# Patient Record
Sex: FEMALE | Race: WHITE | NOT HISPANIC OR LATINO | Employment: OTHER | URBAN - NONMETROPOLITAN AREA
[De-identification: names, ages, dates, MRNs, and addresses within clinical notes are randomized per-mention and may not be internally consistent; named-entity substitution may affect disease eponyms.]

---

## 2018-11-20 ENCOUNTER — TELEPHONE (OUTPATIENT)
Dept: VASCULAR SURGERY | Facility: CLINIC | Age: 48
End: 2018-11-20

## 2022-04-11 ENCOUNTER — TELEPHONE (OUTPATIENT)
Dept: VASCULAR SURGERY | Facility: CLINIC | Age: 52
End: 2022-04-11

## 2022-04-11 NOTE — TELEPHONE ENCOUNTER
Left message for Dr Briones office to please fax over testing results of arteries and veins for Ms Evelyne Chao. I advised Dr Briones's office to please fax over to [phone].

## 2022-04-14 ENCOUNTER — TELEPHONE (OUTPATIENT)
Dept: VASCULAR SURGERY | Facility: CLINIC | Age: 52
End: 2022-04-14

## 2022-04-14 NOTE — TELEPHONE ENCOUNTER
Left message reminding Ms Chao of her appointment for Friday, April 15th, 2022 at 10 am with Melinda MCKEON. Also advised Ms Chao if she had any questions, concerns or needs to reschedule to please call the office at [phone].

## 2022-04-15 ENCOUNTER — OFFICE VISIT (OUTPATIENT)
Dept: VASCULAR SURGERY | Facility: CLINIC | Age: 52
End: 2022-04-15

## 2022-04-15 ENCOUNTER — PATIENT ROUNDING (BHMG ONLY) (OUTPATIENT)
Dept: VASCULAR SURGERY | Facility: CLINIC | Age: 52
End: 2022-04-15

## 2022-04-15 VITALS — DIASTOLIC BLOOD PRESSURE: 76 MMHG | OXYGEN SATURATION: 96 % | HEIGHT: 66 IN | SYSTOLIC BLOOD PRESSURE: 118 MMHG

## 2022-04-15 DIAGNOSIS — E78.2 MIXED HYPERLIPIDEMIA: ICD-10-CM

## 2022-04-15 DIAGNOSIS — I10 BENIGN ESSENTIAL HTN: ICD-10-CM

## 2022-04-15 DIAGNOSIS — I73.9 PAD (PERIPHERAL ARTERY DISEASE): Primary | ICD-10-CM

## 2022-04-15 DIAGNOSIS — I65.23 BILATERAL CAROTID ARTERY STENOSIS: ICD-10-CM

## 2022-04-15 PROCEDURE — 99204 OFFICE O/P NEW MOD 45 MIN: CPT | Performed by: NURSE PRACTITIONER

## 2022-04-15 RX ORDER — RIVAROXABAN 20 MG/1
20 TABLET, FILM COATED ORAL DAILY
COMMUNITY
Start: 2022-02-07 | End: 2022-11-30

## 2022-04-15 RX ORDER — HYDROCODONE BITARTRATE AND ACETAMINOPHEN 7.5; 325 MG/1; MG/1
1 TABLET ORAL EVERY 8 HOURS PRN
COMMUNITY
Start: 2022-03-31

## 2022-04-15 RX ORDER — HYDROCHLOROTHIAZIDE 25 MG/1
TABLET ORAL
COMMUNITY

## 2022-04-15 RX ORDER — METOPROLOL TARTRATE 50 MG/1
50 TABLET, FILM COATED ORAL 2 TIMES DAILY
COMMUNITY
Start: 2022-02-07 | End: 2022-11-30

## 2022-04-15 RX ORDER — TIZANIDINE 4 MG/1
TABLET ORAL DAILY
COMMUNITY

## 2022-04-15 RX ORDER — DULOXETIN HYDROCHLORIDE 60 MG/1
CAPSULE, DELAYED RELEASE ORAL
COMMUNITY
End: 2022-11-30

## 2022-04-15 RX ORDER — LISINOPRIL 20 MG/1
TABLET ORAL
COMMUNITY

## 2022-04-15 RX ORDER — NITROGLYCERIN 0.4 MG/1
TABLET SUBLINGUAL
COMMUNITY

## 2022-04-15 RX ORDER — CYCLOBENZAPRINE HCL 10 MG
TABLET ORAL
COMMUNITY
End: 2022-11-30

## 2022-04-15 NOTE — PROGRESS NOTES
April 15, 2022    Hello, may I speak with Evelyne Chao?    My name is Karen Khoury      I am  with [facility] SURG [facility] VASCULAR SURGERY  [address], [address]  [place] [address] [phone].    Before we get started may I verify your date of birth? 1970    I am calling to officially welcome you to our practice and ask about your recent visit. Is this a good time to talk? yes    Tell me about your visit with us. What things went well?  All staff was very friendly, wait time in back was a little long.     We're always looking for ways to make our patients' experiences even better. Do you have recommendations on ways we may improve?  no    Overall were you satisfied with your first visit to our practice? Yes, it went well.       I appreciate you taking the time to speak with me today. Is there anything else I can do for you? no      Thank you, and have a great day.

## 2022-04-15 NOTE — PROGRESS NOTES
04/15/2022      Wilmar Sewell MD  [address] [place] [address]    Evelyne Chao  1970    Chief Complaint   Patient presents with   • Establish Care     Referred over by Dr Wilmar Sewell for Peripheral Vascular Disease with Leg Pain. Patient denies any stroke like symptoms.    • Other     Patient states she has vascular blockages, with a herniated disk in the back as well. Patient states she has been having bilateral leg pain for the last 3 years.    • smoker     Patient is a Current Everyday Smoker   • Med Management     Verbally verified medications with patient        Dear Wilmar Sewell MD:      HPI  I had the pleasure of seeing your patient Evelyne Chao in the office today.  Thank you kindly for this consultation.  As you recall, Evelyne Chao is a 51 y.o.  female who you are currently following for routine health maintenance.  She reports being told many years ago that she had vascular blockages.  She has been having pain for the past few years.  She has had previous neck surgery.  She also states she does have chronic back pain.  She will be seeing Dr. Scott.   She states her legs are very painful just at rest.  She is maintained on Xarelto.  She is intolerant to multiple statins.    Past Medical History:   Diagnosis Date   • Chronic midline low back pain with bilateral sciatica    • Coronary artery disease    • Diabetes mellitus (HCC)    • Hyperlipidemia    • Hypertension        Past Surgical History:   Procedure Laterality Date   • NECK SURGERY  2020       History reviewed. No pertinent family history.    Social History     Socioeconomic History   • Marital status:    Tobacco Use   • Smoking status: Current Every Day Smoker   • Smokeless tobacco: Never Used   Substance and Sexual Activity   • Alcohol use: Never   • Drug use: Never   • Sexual activity: Defer       Allergies   Allergen Reactions   • Other Other (See Comments)   • Erythromycin Base Other (See Comments)  "      Current Outpatient Medications   Medication Instructions   • cyclobenzaprine (FLEXERIL) 10 MG tablet cyclobenzaprine 10 mg tablet   TAKE 1 TABLET BY MOUTH TWICE DAILY AS NEEDED   • DULoxetine (CYMBALTA) 60 MG capsule duloxetine 60 mg capsule,delayed release   • hydroCHLOROthiazide (HYDRODIURIL) 25 MG tablet hydrochlorothiazide 25 mg tablet   TAKE 1 TABLET BY MOUTH ONCE DAILY   • HYDROcodone-acetaminophen (NORCO) 7.5-325 MG per tablet 1 tablet, Oral, Every 8 Hours PRN, for pain   • lisinopril (PRINIVIL,ZESTRIL) 20 MG tablet lisinopril 20 mg tablet   TAKE 1 TABLET BY MOUTH ONCE DAILY   • metFORMIN (GLUCOPHAGE) 500 MG tablet metformin 500 mg tablet   TAKE 1 TABLET BY MOUTH ONCE DAILY   • metoprolol tartrate (LOPRESSOR) 50 mg, Oral, 2 Times Daily   • nitroglycerin (NITROSTAT) 0.4 MG SL tablet nitroglycerin 0.4 mg sublingual tablet   DISSOLVE ONE TABLET UNDER THE TONGUE EVERY 5 MINUTES AS NEEDED FOR CHEST PAIN. DO NOT EXCEED A TOTAL OF 3 DOSES IN 15 MINUTES   • tiZANidine (ZANAFLEX) 4 MG tablet tizanidine 4 mg tablet   • verapamil SR (CALAN-SR) 180 MG CR tablet verapamil ER (SR) 180 mg tablet,extended release   TAKE 1 TABLET BY MOUTH ONCE DAILY   • Xarelto 20 mg, Oral, Daily         Review of Systems   Constitutional: Negative.    HENT: Negative.    Eyes: Negative.    Respiratory: Negative.    Cardiovascular: Negative.         Severe leg pain   Gastrointestinal: Negative.    Endocrine: Negative.    Genitourinary: Negative.    Musculoskeletal: Positive for back pain, gait problem and neck pain.   Skin: Negative.    Allergic/Immunologic: Negative.    Hematological: Negative.    Psychiatric/Behavioral: Negative.        /76 (BP Location: Left arm, Patient Position: Sitting, Cuff Size: Adult)   Ht 167.6 cm (66\")   SpO2 96%   Physical Exam  Vitals and nursing note reviewed.   Constitutional:       General: She is not in acute distress.     Appearance: She is well-developed. She is not diaphoretic.   HENT:      " Head: Normocephalic and atraumatic.   Eyes:      General: No scleral icterus.     Pupils: Pupils are equal, round, and reactive to light.   Neck:      Thyroid: No thyromegaly.      Vascular: No carotid bruit or JVD.   Cardiovascular:      Rate and Rhythm: Normal rate and regular rhythm.      Pulses: Normal pulses.      Heart sounds: Normal heart sounds and S2 normal. No murmur heard.    No friction rub. No gallop.      Comments: Palpable pulses  Pulmonary:      Effort: Pulmonary effort is normal.      Breath sounds: Normal breath sounds.   Abdominal:      General: Bowel sounds are normal.      Palpations: Abdomen is soft.   Musculoskeletal:         General: Normal range of motion.      Cervical back: Normal range of motion and neck supple.   Skin:     General: Skin is warm and dry.   Neurological:      Mental Status: She is alert and oriented to person, place, and time.      Cranial Nerves: No cranial nerve deficit.   Psychiatric:         Mood and Affect: Mood normal.         Behavior: Behavior normal.         Thought Content: Thought content normal.         Judgment: Judgment normal.         No results found.    Patient Active Problem List   Diagnosis   • Diabetes mellitus (HCC)   • Hyperlipidemia   • Chronic midline low back pain with bilateral sciatica   • Benign essential HTN         ICD-10-CM ICD-9-CM   1. PAD (peripheral artery disease) ([facility])  I73.9 443.9   2. Bilateral carotid artery stenosis  I65.23 433.10     433.30   3. Mixed hyperlipidemia  E78.2 272.2   4. Benign essential HTN  I10 401.1           Plan: After thoroughly evaluating Evelyne Chao, I believe the best course of action is to remain conservative from vascular surgery standpoint.  She is having significant pain to her lower extremities just at rest.  She also cannot walk per her report.  She has palpable pulses.  She does note previously she had testing showing some vascular disease but at that time nothing to do anything about.  She has  chronic back pain and symptoms sound mostly related to this with weakness to her lower extremities and severe pain at rest.  Her feet are nice and warm with palpable pulses.  We will see her back in 6 months but repeat noninvasive testing including ABIs and a carotid duplex.  I did discuss vascular risk factors as they pertain to the progression of vascular disease including controlling her hypertension and hyperlipidemia.  Her blood pressure stable on her current medications.  Her labs are monitored by her primary care provider but she is intolerant to statins.  The patient can continue taking their current medication regimen as previously planned.  This was all discussed in full with complete understanding.    Thank you for allowing me to participate in the care of your patient.  Please do not hesitate with any questions or concerns.  I will keep you aware of any further encounters with Evelyne Chao.        Sincerely yours,         LINDA Mirza

## 2022-04-20 PROBLEM — G89.29 CHRONIC MIDLINE LOW BACK PAIN WITH BILATERAL SCIATICA: Status: ACTIVE | Noted: 2022-04-20

## 2022-04-20 PROBLEM — E11.9 DIABETES MELLITUS: Status: ACTIVE | Noted: 2022-04-20

## 2022-04-20 PROBLEM — I10 BENIGN ESSENTIAL HTN: Status: ACTIVE | Noted: 2022-04-20

## 2022-04-20 PROBLEM — M54.42 CHRONIC MIDLINE LOW BACK PAIN WITH BILATERAL SCIATICA: Status: ACTIVE | Noted: 2022-04-20

## 2022-04-20 PROBLEM — M54.41 CHRONIC MIDLINE LOW BACK PAIN WITH BILATERAL SCIATICA: Status: ACTIVE | Noted: 2022-04-20

## 2022-04-20 PROBLEM — E78.5 HYPERLIPIDEMIA: Status: ACTIVE | Noted: 2022-04-20

## 2022-10-18 ENCOUNTER — TELEPHONE (OUTPATIENT)
Dept: VASCULAR SURGERY | Facility: CLINIC | Age: 52
End: 2022-10-18

## 2022-11-30 ENCOUNTER — OFFICE VISIT (OUTPATIENT)
Dept: CARDIOLOGY | Facility: CLINIC | Age: 52
End: 2022-11-30

## 2022-11-30 VITALS
HEIGHT: 66 IN | BODY MASS INDEX: 24.65 KG/M2 | DIASTOLIC BLOOD PRESSURE: 78 MMHG | WEIGHT: 153.4 LBS | OXYGEN SATURATION: 98 % | SYSTOLIC BLOOD PRESSURE: 120 MMHG | HEART RATE: 84 BPM

## 2022-11-30 DIAGNOSIS — I48.0 PAROXYSMAL ATRIAL FIBRILLATION: ICD-10-CM

## 2022-11-30 DIAGNOSIS — R07.89 CHEST PRESSURE: Primary | ICD-10-CM

## 2022-11-30 DIAGNOSIS — I73.9 PAD (PERIPHERAL ARTERY DISEASE): ICD-10-CM

## 2022-11-30 DIAGNOSIS — Z72.0 TOBACCO ABUSE: ICD-10-CM

## 2022-11-30 DIAGNOSIS — E78.2 MIXED HYPERLIPIDEMIA: ICD-10-CM

## 2022-11-30 DIAGNOSIS — I10 BENIGN ESSENTIAL HTN: ICD-10-CM

## 2022-11-30 PROBLEM — Z95.818 IMPLANTABLE LOOP RECORDER PRESENT: Status: ACTIVE | Noted: 2022-11-30

## 2022-11-30 PROCEDURE — 93000 ELECTROCARDIOGRAM COMPLETE: CPT | Performed by: INTERNAL MEDICINE

## 2022-11-30 PROCEDURE — 99204 OFFICE O/P NEW MOD 45 MIN: CPT | Performed by: INTERNAL MEDICINE

## 2022-11-30 RX ORDER — LANOLIN ALCOHOL/MO/W.PET/CERES
1000 CREAM (GRAM) TOPICAL DAILY
COMMUNITY

## 2022-11-30 RX ORDER — ASPIRIN 81 MG/1
81 TABLET ORAL DAILY
COMMUNITY

## 2022-11-30 NOTE — PROGRESS NOTES
Reason for Visit: Atrial fibrillation, heart disease.    HPI:  Evelyne Chao is a 52 y.o. female is being seen as a Self Referring for atrial fibrillation and heart disease.  She previously followed with a cardiologist in [place], Dr Soto, but he moved to [place].  She did have some testing done in [place] as well.  She reports having 40% blockage in the past, but most recently had a heart cath at [facility] in [place] did not show any significant disease.  She has been under a lot of stress recently with the death of her dad.  She reports having pressure in her chest and numbness in her jaw.  This tends to happen randomly.  She has not been successful at quitting smoking.  She reports being diagnosed with atrial fibrillation in January.      Previous Cardiac Testing and Procedures:  -Stress ECHO (12/17/2014) low risk for ischemia    Lab data:   -Lipid panel (9/8/2021) 330/20/NC/1173  -BMP (2/7/2022) creatinine 0.7, potassium 3.7, sodium 132  -Troponin (2/7/2022) < 0.012    Patient Active Problem List   Diagnosis   • Diabetes mellitus (HCC)   • Mixed hyperlipidemia   • Chronic midline low back pain with bilateral sciatica   • Benign essential HTN   • Tobacco abuse   • PAD (peripheral artery disease) ([facility])   • Implantable loop recorder present   • Paroxysmal atrial fibrillation ([facility])       Social History     Tobacco Use   • Smoking status: Every Day     Packs/day: 1.00     Types: Cigarettes   • Smokeless tobacco: Never   Vaping Use   • Vaping Use: Former   Substance Use Topics   • Alcohol use: Never   • Drug use: Not Currently       Family History   Problem Relation Age of Onset   • Hypertension Mother    • Diabetes Mother    • Diabetes Father        The following portions of the patient's history were reviewed and updated as appropriate: allergies, current medications, past family history, past medical history, past social history, past surgical history and problem list.      Current Outpatient  "Medications:   •  aspirin 81 MG EC tablet, Take 81 mg by mouth Daily., Disp: , Rfl:   •  hydroCHLOROthiazide (HYDRODIURIL) 25 MG tablet, hydrochlorothiazide 25 mg tablet  TAKE 1 TABLET BY MOUTH ONCE DAILY, Disp: , Rfl:   •  HYDROcodone-acetaminophen (NORCO) 7.5-325 MG per tablet, Take 1 tablet by mouth Every 8 (Eight) Hours As Needed. for pain, Disp: , Rfl:   •  lisinopril (PRINIVIL,ZESTRIL) 20 MG tablet, lisinopril 20 mg tablet  TAKE 1 TABLET BY MOUTH ONCE DAILY, Disp: , Rfl:   •  metFORMIN (GLUCOPHAGE) 500 MG tablet, metformin 500 mg tablet  TAKE 1 TABLET BY MOUTH ONCE DAILY, Disp: , Rfl:   •  nitroglycerin (NITROSTAT) 0.4 MG SL tablet, nitroglycerin 0.4 mg sublingual tablet  DISSOLVE ONE TABLET UNDER THE TONGUE EVERY 5 MINUTES AS NEEDED FOR CHEST PAIN. DO NOT EXCEED A TOTAL OF 3 DOSES IN 15 MINUTES, Disp: , Rfl:   •  tiZANidine (ZANAFLEX) 4 MG tablet, Take  by mouth Daily., Disp: , Rfl:   •  verapamil SR (CALAN-SR) 180 MG CR tablet, verapamil ER (SR) 180 mg tablet,extended release  TAKE 1 TABLET BY MOUTH ONCE DAILY, Disp: , Rfl:   •  vitamin B-12 (CYANOCOBALAMIN) 1000 MCG tablet, Take 1,000 mcg by mouth Daily., Disp: , Rfl:     Review of Systems   Constitutional: Negative for chills and fever.   Cardiovascular: Positive for chest pain (tightness). Negative for paroxysmal nocturnal dyspnea.   Respiratory: Negative for cough and shortness of breath.    Skin: Negative for rash.   Gastrointestinal: Negative for abdominal pain and heartburn.   Neurological: Negative for dizziness and numbness.       Objective   /78 (BP Location: Left arm, Patient Position: Sitting, Cuff Size: Adult)   Pulse 84   Ht 167.6 cm (66\")   Wt 69.6 kg (153 lb 6.4 oz)   SpO2 98%   BMI 24.76 kg/m²   Constitutional:       Appearance: Well-developed and normal weight.   HENT:      Head: Normocephalic and atraumatic.   Pulmonary:      Effort: Pulmonary effort is normal.      Breath sounds: Normal breath sounds.   Cardiovascular:      " "Normal rate. Regular rhythm.      Murmurs: There is no murmur.      No gallop. No click.   Skin:     General: Skin is warm and dry.   Neurological:      Mental Status: Alert and oriented to person, place, and time.         ECG 12 Lead    Date/Time: 11/30/2022 11:39 AM  Performed by: Phong Loera MD  Authorized by: Phong Loera MD   Comparison: compared with previous ECG from 12/16/2014  Similar to previous ECG  Rhythm: sinus rhythm  Rate: normal  Other findings: prolonged QTc interval              ICD-10-CM ICD-9-CM   1. Chest pressure  R07.89 786.59   2. Paroxysmal atrial fibrillation (HCC)  I48.0 427.31   3. PAD (peripheral artery disease) (HCC)  I73.9 443.9   4. Mixed hyperlipidemia  E78.2 272.2   5. Benign essential HTN  I10 401.1   6. Tobacco abuse  Z72.0 305.1         Assessment/Plan:  1.  Chest pressure: Somewhat atypical characteristics.  She reports having a heart cath in either December 2021 or January 2022 that was normal.  Will obtain copy of her previous cardiac records including this heart cath prior to ordering any further testing.    2.  Paroxysmal atrial fibrillation: Patient reports this was diagnosed in January of this year.  All available EKGs show sinus rhythm.  She was started on Xarelto but noted she could not afford it and she reports that her cardiologist told her \"I am not sure he really needs it anyway\", and therefore it was discontinued.  Obtain previous records prior to making any further treatment decisions.    3.  Peripheral arterial disease: Patient reports having moderate nonobstructive disease in the range of 50 to 70% in both her legs and carotids.  Obtain previous cardiac records.  Continue aspirin.    4.  Mixed hyperlipidemia: Very poor control on lipid panel from 9/8/2021.  There is severe elevations in triglycerides and total cholesterol with low HDL.  LDL was not calculated due to the severe triglyceride elevation.  She reports significant intolerance to statins.  She " reports taking over-the-counter omega-3 fish oil.  She notes that her diet is intermediate at best.  Order repeat lipid panel to be done prior to follow-up appointment.    5.  Essential hypertension: Blood pressure is within normal limits today.  Continue HCTZ, lisinopril, and verapamil.    6.  Tobacco abuse: Evelyne Chao  reports that she has been smoking cigarettes. She has been smoking an average of 1 pack per day. She has never used smokeless tobacco.. I have educated her on the risk of diseases from using tobacco products such as cancer, COPD and heart disease.  I advised her to quit and she is not willing to quit.  I spent 4.5 minutes counseling the patient.

## 2022-12-02 ENCOUNTER — PATIENT ROUNDING (BHMG ONLY) (OUTPATIENT)
Dept: CARDIOLOGY | Facility: CLINIC | Age: 52
End: 2022-12-02

## 2022-12-02 NOTE — PROGRESS NOTES
December 2, 2022    Hello, may I speak with Evelyne Chao?    My name is Ant,       I am  with [facility] CARDIOLOGY [place] [facility]  [place] [address] [phone].    Before we get started may I verify your date of birth? 1970    I am calling to officially welcome you to our practice and ask about your recent visit. Is this a good time to talk? yes    Tell me about your visit with us. What things went well?  Everyone was nice        We're always looking for ways to make our patients' experiences even better. Do you have recommendations on ways we may improve?  no    Overall were you satisfied with your first visit to our practice? yes       I appreciate you taking the time to speak with me today. Is there anything else I can do for you? no      Thank you, and have a great day.

## 2022-12-28 ENCOUNTER — TELEPHONE (OUTPATIENT)
Dept: VASCULAR SURGERY | Facility: CLINIC | Age: 52
End: 2022-12-28

## 2022-12-28 NOTE — TELEPHONE ENCOUNTER
CALLED TO SEE IF PATIENT IS READY TO RESCHEDULE CANCELLED APPOINTMENT ON 10/17/22 WITH MARY JO, BUT NO ANSWER. LEFT  WITH CONTACT INFO FOR RETURN CALL..

## 2023-07-24 ENCOUNTER — LAB (OUTPATIENT)
Dept: LAB | Facility: HOSPITAL | Age: 53
End: 2023-07-24
Payer: COMMERCIAL

## 2023-07-24 ENCOUNTER — OFFICE VISIT (OUTPATIENT)
Dept: CARDIOLOGY | Facility: CLINIC | Age: 53
End: 2023-07-24
Payer: COMMERCIAL

## 2023-07-24 ENCOUNTER — CLINICAL SUPPORT NO REQUIREMENTS (OUTPATIENT)
Dept: CARDIOLOGY | Facility: CLINIC | Age: 53
End: 2023-07-24
Payer: COMMERCIAL

## 2023-07-24 VITALS
SYSTOLIC BLOOD PRESSURE: 138 MMHG | OXYGEN SATURATION: 99 % | RESPIRATION RATE: 18 BRPM | BODY MASS INDEX: 23.14 KG/M2 | HEIGHT: 66 IN | WEIGHT: 144 LBS | DIASTOLIC BLOOD PRESSURE: 78 MMHG | HEART RATE: 66 BPM

## 2023-07-24 DIAGNOSIS — I65.23 BILATERAL CAROTID ARTERY STENOSIS: ICD-10-CM

## 2023-07-24 DIAGNOSIS — E78.2 MIXED HYPERLIPIDEMIA: ICD-10-CM

## 2023-07-24 DIAGNOSIS — E78.2 MIXED HYPERLIPIDEMIA: Primary | ICD-10-CM

## 2023-07-24 DIAGNOSIS — I10 BENIGN ESSENTIAL HTN: ICD-10-CM

## 2023-07-24 DIAGNOSIS — Z72.0 TOBACCO ABUSE: ICD-10-CM

## 2023-07-24 DIAGNOSIS — I47.20 VT (VENTRICULAR TACHYCARDIA): ICD-10-CM

## 2023-07-24 DIAGNOSIS — I73.9 PAD (PERIPHERAL ARTERY DISEASE): ICD-10-CM

## 2023-07-24 DIAGNOSIS — I48.0 PAROXYSMAL ATRIAL FIBRILLATION: Primary | ICD-10-CM

## 2023-07-24 DIAGNOSIS — Z95.818 STATUS POST PLACEMENT OF IMPLANTABLE LOOP RECORDER: Primary | ICD-10-CM

## 2023-07-24 DIAGNOSIS — Z95.818 IMPLANTABLE LOOP RECORDER PRESENT: ICD-10-CM

## 2023-07-24 LAB
CHOLEST SERPL-MCNC: 240 MG/DL (ref 0–200)
HDLC SERPL-MCNC: 31 MG/DL (ref 40–60)
LDLC SERPL CALC-MCNC: 153 MG/DL (ref 0–100)
LDLC/HDLC SERPL: 4.82 {RATIO}
TRIGL SERPL-MCNC: 298 MG/DL (ref 0–150)
VLDLC SERPL-MCNC: 56 MG/DL (ref 5–40)

## 2023-07-24 PROCEDURE — 99214 OFFICE O/P EST MOD 30 MIN: CPT | Performed by: INTERNAL MEDICINE

## 2023-07-24 PROCEDURE — 80061 LIPID PANEL: CPT

## 2023-07-24 PROCEDURE — 93000 ELECTROCARDIOGRAM COMPLETE: CPT | Performed by: INTERNAL MEDICINE

## 2023-07-24 PROCEDURE — 36415 COLL VENOUS BLD VENIPUNCTURE: CPT

## 2023-07-24 PROCEDURE — 93285 PRGRMG DEV EVAL SCRMS IP: CPT | Performed by: INTERNAL MEDICINE

## 2023-07-24 NOTE — PROGRESS NOTES
Implantable Cardiac Monitor Report - IN OFFICE    July 24, 2023    Primary Cardiologist:  Luz Maria  :  [employer] [facility] [place]  Model:  Jot Dx ICM [id number]    Reason for implant:   Ventricular Tachycardia  Date of implant:  3/4/2022    Battery:  Satisfactory    Events:  No events since 7/23/23.  Since implant, patient has had 9 tachy episodes, 11 sarai episodes, 2 pauses, and has marked 3 symptoms according to device counters.  Per patient, she did not mame any symptoms and has not had any symptoms since implant.  No past episodes can be assessed on  - RN will assess when patient has been transferred in [place].    Changes:  Dr. Loera's information added to device.     Home Monitor: Yes, esther on her smart phone.  Transfer request sent in [place].    Follow up:  Monthly and PRN remotely.  Patient given RN's direct line for any questions or concerns.

## 2023-07-24 NOTE — LETTER
July 24, 2023     LINDA Matias  [address] [address]    Patient: Evelyne Chao   YOB: 1970   Date of Visit: 7/24/2023       Dear LINDA Matias    Evelyne Chao was in my office today. Below is a copy of my note.    If you have questions, please do not hesitate to call me. I look forward to following Evelyne along with you.         Sincerely,        Phong Loera MD        CC: No Recipients      Reason for Visit: cardiovascular follow up.    HPI:  Evelyne Chao is a 52 y.o. female is here today for follow-up.  She has had a lot of life issues since her last visit in November.  She has gone through a divorce and also had to move.  Despite these changes she is doing better from a cardiac standpoint.  She denies any significant chest pain, palpitations, dizziness, syncope, PND, or orthopnea.  She has been more active.  She is hoping to get [place] [employer] [place]  She got her lipid panel repeated before her office visit today.  While the numbers were significantly elevated, did show improvement compared to prior lipid panel.  She has a history of statin intolerance.  She is still smoking and is not interested in quitting.  She understands the increased risk of cardiovascular events.      Previous Cardiac Testing and Procedures:  -Stress echo (12/17/2014) low risk for ischemia  -Echo (12/2020) EF 69%, mild MR  -LHC (2/2022) nonobstructive disease  -Loop recorder implantation (3/4/2022)       Lab data:   -Lipid panel (9/8/2021) total cholesterol 330, HDL 20, LDL not calculated, triglycerides 1173  -BMP (2/7/2022) creatinine 0.7, potassium 3.7, sodium 132  -Troponin (2/7/2022) < 0.012  -Lipid panel (7/24/2023) total cholesterol 240, HDL 31, , triglycerides 298    Patient Active Problem List   Diagnosis   • Diabetes mellitus   • Mixed hyperlipidemia   • Chronic midline low back pain with bilateral sciatica   • Benign  essential HTN   • Tobacco abuse   • PAD (peripheral artery disease)   • Implantable loop recorder present   • Paroxysmal atrial fibrillation   • Bilateral carotid artery stenosis       Social History     Tobacco Use   • Smoking status: Every Day     Packs/day: 1.00     Types: Cigarettes   • Smokeless tobacco: Never   Vaping Use   • Vaping Use: Former   Substance Use Topics   • Alcohol use: Never   • Drug use: Not Currently       Family History   Problem Relation Age of Onset   • Hypertension Mother    • Diabetes Mother    • Diabetes Father        The following portions of the patient's history were reviewed and updated as appropriate: allergies, current medications, past family history, past medical history, past social history, past surgical history, and problem list.      Current Outpatient Medications:   •  aspirin 81 MG EC tablet, Take 1 tablet by mouth Daily., Disp: , Rfl:   •  GABAPENTIN PO, Take  by mouth., Disp: , Rfl:   •  hydroCHLOROthiazide (HYDRODIURIL) 25 MG tablet, hydrochlorothiazide 25 mg tablet  TAKE 1 TABLET BY MOUTH ONCE DAILY, Disp: , Rfl:   •  lisinopril (PRINIVIL,ZESTRIL) 20 MG tablet, lisinopril 20 mg tablet  TAKE 1 TABLET BY MOUTH ONCE DAILY, Disp: , Rfl:   •  nitroglycerin (NITROSTAT) 0.4 MG SL tablet, nitroglycerin 0.4 mg sublingual tablet  DISSOLVE ONE TABLET UNDER THE TONGUE EVERY 5 MINUTES AS NEEDED FOR CHEST PAIN. DO NOT EXCEED A TOTAL OF 3 DOSES IN 15 MINUTES, Disp: , Rfl:   •  tiZANidine (ZANAFLEX) 4 MG tablet, Take  by mouth Daily., Disp: , Rfl:   •  verapamil SR (CALAN-SR) 180 MG CR tablet, verapamil ER (SR) 180 mg tablet,extended release  TAKE 1 TABLET BY MOUTH ONCE DAILY, Disp: , Rfl:     Review of Systems   Constitutional: Negative for chills and fever.   Cardiovascular:  Negative for chest pain and paroxysmal nocturnal dyspnea.   Respiratory:  Negative for cough and shortness of breath.    Skin:  Negative for rash.   Gastrointestinal:  Negative for abdominal pain and heartburn.  "  Neurological:  Negative for dizziness and numbness.     Objective  /78 (BP Location: Right arm, Patient Position: Sitting)   Pulse 66   Resp 18   Ht 167.6 cm (66\")   Wt 65.3 kg (144 lb)   SpO2 99%   BMI 23.24 kg/m²   Constitutional:       Appearance: Well-developed.   HENT:      Head: Normocephalic and atraumatic.   Pulmonary:      Effort: Pulmonary effort is normal.      Breath sounds: Normal breath sounds.   Cardiovascular:      Normal rate. Regular rhythm.      Murmurs: There is no murmur.   Edema:     Peripheral edema absent.   Skin:     General: Skin is warm and dry.   Neurological:      Mental Status: Alert and oriented to person, place, and time.       ECG 12 Lead    Date/Time: 7/24/2023 12:22 PM  Performed by: Phong Loera MD  Authorized by: Phong Loera MD   Comparison: compared with previous ECG from 3/23/2023  Similar to previous ECG  Rhythm: sinus rhythm  Rate: normal  Conduction: incomplete right bundle branch block  Q waves: II, III and aVF            ICD-10-CM ICD-9-CM   1. Paroxysmal atrial fibrillation  I48.0 427.31   2. Bilateral carotid artery stenosis  I65.23 433.10     433.30   3. PAD (peripheral artery disease)  I73.9 443.9   4. Mixed hyperlipidemia  E78.2 272.2   5. Benign essential HTN  I10 401.1   6. Tobacco abuse  Z72.0 305.1   7. Implantable loop recorder present  Z95.818 V45.09         Assessment/Plan:  1. Paroxysmal atrial fibrillation: Appears to be somewhat questionable diagnosis based on review of prior cardiac records.  There is no available EKG or rhythm strip that shows any definitive diagnosis of this.  Anticoagulation was stopped in the past due to the questionable nature of this diagnosis.  We will continue aspirin only at this time unless there is definitive evidence of atrial fibrillation.  Interrogate implantable loop recorder.     2.  Peripheral arterial disease: Patient reports having moderate nonobstructive disease in the range of 50 to 70% in both " her legs and carotids.  Order repeat carotid ultrasound for surveillance.  Continue aspirin.     3.  Mixed hyperlipidemia: Very poor control on lipid panel from 9/8/2021.  Repeat lipid panel from 7/24/2023 was improved but still significantly elevated.  She has a history of statin intolerance.  We discussed additional treatment options in detail including nonstatin medication options.  She acknowledged understanding of the risks that untreated hyperlipidemia poses and is not willing to try additional medications at this time.  Counseled on lifestyle modification.      4.  Essential hypertension: Blood pressure is borderline elevated at 138 mmHg systolic.  Diastolic is normal at 78 mmHg.  She was encouraged to monitor this closely at home.  Continue lisinopril, verapamil, and HCTZ.     5.  Tobacco abuse: Evelyne Chao  reports that she has been smoking cigarettes. She has been smoking an average of 1 pack per day. She has never used smokeless tobacco.. I have educated her on the risk of diseases from using tobacco products such as cancer, COPD, and heart disease. I advised her to quit and she is not willing to quit. I spent 3.5 minutes counseling the patient.    6.  Loop recorder in place: Set up for interrogation in office today.

## 2023-07-24 NOTE — PROGRESS NOTES
Reason for Visit: cardiovascular follow up.    HPI:  Evelyne Chao is a 52 y.o. female is here today for follow-up.  She has had a lot of life issues since her last visit in November.  She has gone through a divorce and also had to move.  Despite these changes she is doing better from a cardiac standpoint.  She denies any significant chest pain, palpitations, dizziness, syncope, PND, or orthopnea.  She has been more active.  She is hoping to get [place] [employer] [place]  She got her lipid panel repeated before her office visit today.  While the numbers were significantly elevated, did show improvement compared to prior lipid panel.  She has a history of statin intolerance.  She is still smoking and is not interested in quitting.  She understands the increased risk of cardiovascular events.      Previous Cardiac Testing and Procedures:  -Stress echo (12/17/2014) low risk for ischemia  -Echo (12/2020) EF 69%, mild MR  -LHC (2/2022) nonobstructive disease  -Loop recorder implantation (3/4/2022)       Lab data:   -Lipid panel (9/8/2021) total cholesterol 330, HDL 20, LDL not calculated, triglycerides 1173  -BMP (2/7/2022) creatinine 0.7, potassium 3.7, sodium 132  -Troponin (2/7/2022) < 0.012  -Lipid panel (7/24/2023) total cholesterol 240, HDL 31, , triglycerides 298    Patient Active Problem List   Diagnosis    Diabetes mellitus    Mixed hyperlipidemia    Chronic midline low back pain with bilateral sciatica    Benign essential HTN    Tobacco abuse    PAD (peripheral artery disease)    Implantable loop recorder present    Paroxysmal atrial fibrillation    Bilateral carotid artery stenosis       Social History     Tobacco Use    Smoking status: Every Day     Packs/day: 1.00     Types: Cigarettes    Smokeless tobacco: Never   Vaping Use    Vaping Use: Former   Substance Use Topics    Alcohol use: Never    Drug use: Not Currently       Family History   Problem Relation Age of Onset     "Hypertension Mother     Diabetes Mother     Diabetes Father        The following portions of the patient's history were reviewed and updated as appropriate: allergies, current medications, past family history, past medical history, past social history, past surgical history, and problem list.      Current Outpatient Medications:     aspirin 81 MG EC tablet, Take 1 tablet by mouth Daily., Disp: , Rfl:     GABAPENTIN PO, Take  by mouth., Disp: , Rfl:     hydroCHLOROthiazide (HYDRODIURIL) 25 MG tablet, hydrochlorothiazide 25 mg tablet  TAKE 1 TABLET BY MOUTH ONCE DAILY, Disp: , Rfl:     lisinopril (PRINIVIL,ZESTRIL) 20 MG tablet, lisinopril 20 mg tablet  TAKE 1 TABLET BY MOUTH ONCE DAILY, Disp: , Rfl:     nitroglycerin (NITROSTAT) 0.4 MG SL tablet, nitroglycerin 0.4 mg sublingual tablet  DISSOLVE ONE TABLET UNDER THE TONGUE EVERY 5 MINUTES AS NEEDED FOR CHEST PAIN. DO NOT EXCEED A TOTAL OF 3 DOSES IN 15 MINUTES, Disp: , Rfl:     tiZANidine (ZANAFLEX) 4 MG tablet, Take  by mouth Daily., Disp: , Rfl:     verapamil SR (CALAN-SR) 180 MG CR tablet, verapamil ER (SR) 180 mg tablet,extended release  TAKE 1 TABLET BY MOUTH ONCE DAILY, Disp: , Rfl:     Review of Systems   Constitutional: Negative for chills and fever.   Cardiovascular:  Negative for chest pain and paroxysmal nocturnal dyspnea.   Respiratory:  Negative for cough and shortness of breath.    Skin:  Negative for rash.   Gastrointestinal:  Negative for abdominal pain and heartburn.   Neurological:  Negative for dizziness and numbness.     Objective   /78 (BP Location: Right arm, Patient Position: Sitting)   Pulse 66   Resp 18   Ht 167.6 cm (66\")   Wt 65.3 kg (144 lb)   SpO2 99%   BMI 23.24 kg/m²   Constitutional:       Appearance: Well-developed.   HENT:      Head: Normocephalic and atraumatic.   Pulmonary:      Effort: Pulmonary effort is normal.      Breath sounds: Normal breath sounds.   Cardiovascular:      Normal rate. Regular rhythm.      Murmurs: " There is no murmur.   Edema:     Peripheral edema absent.   Skin:     General: Skin is warm and dry.   Neurological:      Mental Status: Alert and oriented to person, place, and time.       ECG 12 Lead    Date/Time: 7/24/2023 12:22 PM  Performed by: Phong Loera MD  Authorized by: Phong Loera MD   Comparison: compared with previous ECG from 3/23/2023  Similar to previous ECG  Rhythm: sinus rhythm  Rate: normal  Conduction: incomplete right bundle branch block  Q waves: II, III and aVF            ICD-10-CM ICD-9-CM   1. Paroxysmal atrial fibrillation  I48.0 427.31   2. Bilateral carotid artery stenosis  I65.23 433.10     433.30   3. PAD (peripheral artery disease)  I73.9 443.9   4. Mixed hyperlipidemia  E78.2 272.2   5. Benign essential HTN  I10 401.1   6. Tobacco abuse  Z72.0 305.1   7. Implantable loop recorder present  Z95.818 V45.09         Assessment/Plan:  1. Paroxysmal atrial fibrillation: Appears to be somewhat questionable diagnosis based on review of prior cardiac records.  There is no available EKG or rhythm strip that shows any definitive diagnosis of this.  Anticoagulation was stopped in the past due to the questionable nature of this diagnosis.  We will continue aspirin only at this time unless there is definitive evidence of atrial fibrillation.  Interrogate implantable loop recorder.     2.  Peripheral arterial disease: Patient reports having moderate nonobstructive disease in the range of 50 to 70% in both her legs and carotids.  Order repeat carotid ultrasound for surveillance.  Continue aspirin.     3.  Mixed hyperlipidemia: Very poor control on lipid panel from 9/8/2021.  Repeat lipid panel from 7/24/2023 was improved but still significantly elevated.  She has a history of statin intolerance.  We discussed additional treatment options in detail including nonstatin medication options.  She acknowledged understanding of the risks that untreated hyperlipidemia poses and is not willing to try  additional medications at this time.  Counseled on lifestyle modification.      4.  Essential hypertension: Blood pressure is borderline elevated at 138 mmHg systolic.  Diastolic is normal at 78 mmHg.  She was encouraged to monitor this closely at home.  Continue lisinopril, verapamil, and HCTZ.     5.  Tobacco abuse: Evelyne Chao  reports that she has been smoking cigarettes. She has been smoking an average of 1 pack per day. She has never used smokeless tobacco.. I have educated her on the risk of diseases from using tobacco products such as cancer, COPD, and heart disease. I advised her to quit and she is not willing to quit. I spent 3.5 minutes counseling the patient.    6.  Loop recorder in place: Set up for interrogation in office today.

## 2023-07-25 NOTE — PROGRESS NOTES
I have reviewed the notes, assessments, and/or procedures performed by Fatou Shelton, I concur with her/his documentation of Evelyne Chao.

## 2023-07-28 ENCOUNTER — HOSPITAL ENCOUNTER (OUTPATIENT)
Dept: ULTRASOUND IMAGING | Facility: HOSPITAL | Age: 53
Discharge: HOME | End: 2023-07-28
Payer: COMMERCIAL

## 2023-07-28 PROCEDURE — 93880 EXTRACRANIAL BILAT STUDY: CPT

## 2023-08-01 ENCOUNTER — TELEPHONE (OUTPATIENT)
Dept: CARDIOLOGY | Facility: CLINIC | Age: 53
End: 2023-08-01
Payer: COMMERCIAL

## 2023-08-01 NOTE — TELEPHONE ENCOUNTER
Pt said she had a holter monitor that was done 2 years ago in [place] that showed afib. She is wanting us to obtain this records. The test was ordered by Dr Briones. I sent a records from [facility].

## 2023-08-22 ENCOUNTER — TELEPHONE (OUTPATIENT)
Dept: CARDIOLOGY | Facility: CLINIC | Age: 53
End: 2023-08-22
Payer: COMMERCIAL

## 2023-08-22 NOTE — TELEPHONE ENCOUNTER
She is thinking of going back to work in the [employer] for [employer]. 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 She wants medical clearance from Dr Loera.    Call returned to and I left a vm to send or fax us job descriptions for Dr Loera to review and we will go from there.

## 2023-08-24 ENCOUNTER — TELEPHONE (OUTPATIENT)
Dept: CARDIOLOGY | Facility: CLINIC | Age: 53
End: 2023-08-24
Payer: COMMERCIAL

## 2023-08-24 NOTE — TELEPHONE ENCOUNTER
Pt needs to sign medical release.    Records from [facility] Dr Loera are printed and ready for pt to  whenever.      She is aware that she has to request her other cardiac records from her former cardiology.

## 2024-04-08 ENCOUNTER — TRANSCRIBE ORDERS (OUTPATIENT)
Dept: ADMINISTRATIVE | Facility: HOSPITAL | Age: 54
End: 2024-04-08
Payer: COMMERCIAL

## 2024-04-08 DIAGNOSIS — M54.2 CERVICALGIA: Primary | ICD-10-CM
